# Patient Record
Sex: FEMALE | Race: WHITE | NOT HISPANIC OR LATINO | URBAN - METROPOLITAN AREA
[De-identification: names, ages, dates, MRNs, and addresses within clinical notes are randomized per-mention and may not be internally consistent; named-entity substitution may affect disease eponyms.]

---

## 2023-01-01 ENCOUNTER — NURSING HOME VISIT (OUTPATIENT)
Dept: POST ACUTE CARE | Facility: EXTERNAL LOCATION | Age: 88
End: 2023-01-01
Payer: MEDICARE

## 2023-01-01 DIAGNOSIS — F33.1 MODERATE EPISODE OF RECURRENT MAJOR DEPRESSIVE DISORDER (MULTI): ICD-10-CM

## 2023-01-01 DIAGNOSIS — F02.80 ALZHEIMER DISEASE (MULTI): ICD-10-CM

## 2023-01-01 DIAGNOSIS — G30.9 ALZHEIMER DISEASE (MULTI): ICD-10-CM

## 2023-01-01 DIAGNOSIS — K21.9 GERD WITHOUT ESOPHAGITIS: ICD-10-CM

## 2023-01-01 DIAGNOSIS — F02.80 ALZHEIMER DISEASE (MULTI): Primary | ICD-10-CM

## 2023-01-01 DIAGNOSIS — E44.0 MODERATE PROTEIN-CALORIE MALNUTRITION (MULTI): ICD-10-CM

## 2023-01-01 DIAGNOSIS — Z00.00 MEDICARE ANNUAL WELLNESS VISIT, SUBSEQUENT: Primary | ICD-10-CM

## 2023-01-01 DIAGNOSIS — G30.9 ALZHEIMER DISEASE (MULTI): Primary | ICD-10-CM

## 2023-01-01 PROCEDURE — G0439 PPPS, SUBSEQ VISIT: HCPCS | Performed by: INTERNAL MEDICINE

## 2023-01-01 PROCEDURE — 99497 ADVNCD CARE PLAN 30 MIN: CPT | Performed by: INTERNAL MEDICINE

## 2023-01-01 PROCEDURE — 99308 SBSQ NF CARE LOW MDM 20: CPT | Performed by: INTERNAL MEDICINE

## 2023-01-01 ASSESSMENT — PATIENT HEALTH QUESTIONNAIRE - PHQ9
4. FEELING TIRED OR HAVING LITTLE ENERGY: MORE THAN HALF THE DAYS
10. IF YOU CHECKED OFF ANY PROBLEMS, HOW DIFFICULT HAVE THESE PROBLEMS MADE IT FOR YOU TO DO YOUR WORK, TAKE CARE OF THINGS AT HOME, OR GET ALONG WITH OTHER PEOPLE: EXTREMELY DIFFICULT
7. TROUBLE CONCENTRATING ON THINGS, SUCH AS READING THE NEWSPAPER OR WATCHING TELEVISION: MORE THAN HALF THE DAYS
2. FEELING DOWN, DEPRESSED OR HOPELESS: MORE THAN HALF THE DAYS
1. LITTLE INTEREST OR PLEASURE IN DOING THINGS: MORE THAN HALF THE DAYS
9. THOUGHTS THAT YOU WOULD BE BETTER OFF DEAD, OR OF HURTING YOURSELF: SEVERAL DAYS
6. FEELING BAD ABOUT YOURSELF - OR THAT YOU ARE A FAILURE OR HAVE LET YOURSELF OR YOUR FAMILY DOWN: NEARLY EVERY DAY
SUM OF ALL RESPONSES TO PHQ QUESTIONS 1-9: 18
8. MOVING OR SPEAKING SO SLOWLY THAT OTHER PEOPLE COULD HAVE NOTICED. OR THE OPPOSITE, BEING SO FIGETY OR RESTLESS THAT YOU HAVE BEEN MOVING AROUND A LOT MORE THAN USUAL: SEVERAL DAYS
SUM OF ALL RESPONSES TO PHQ9 QUESTIONS 1 AND 2: 4
5. POOR APPETITE OR OVEREATING: NEARLY EVERY DAY
3. TROUBLE FALLING OR STAYING ASLEEP OR SLEEPING TOO MUCH: MORE THAN HALF THE DAYS

## 2023-01-01 ASSESSMENT — ACTIVITIES OF DAILY LIVING (ADL)
TAKING_MEDICATION: TOTAL CARE
GROCERY_SHOPPING: TOTAL CARE
BATHING: UNABLE TO ASSESS
MANAGING_FINANCES: TOTAL CARE
DOING_HOUSEWORK: TOTAL CARE
DRESSING: UNABLE TO ASSESS

## 2023-04-23 PROBLEM — K21.9 GERD WITHOUT ESOPHAGITIS: Status: ACTIVE | Noted: 2023-01-01

## 2023-04-23 PROBLEM — F33.1 MODERATE EPISODE OF RECURRENT MAJOR DEPRESSIVE DISORDER (MULTI): Status: ACTIVE | Noted: 2023-01-01

## 2023-04-23 PROBLEM — F02.80 ALZHEIMER DISEASE (MULTI): Status: ACTIVE | Noted: 2023-01-01

## 2023-04-23 PROBLEM — H90.3 BILATERAL SENSORINEURAL HEARING LOSS: Status: ACTIVE | Noted: 2023-01-01

## 2023-04-23 PROBLEM — G30.9 ALZHEIMER DISEASE (MULTI): Status: ACTIVE | Noted: 2023-01-01

## 2023-04-23 PROBLEM — E44.0 MODERATE PROTEIN-CALORIE MALNUTRITION (MULTI): Status: ACTIVE | Noted: 2023-01-01

## 2023-04-23 NOTE — LETTER
Patient: Suzy Bustillos  : 1932    Encounter Date: 2023    Patient ID: Suzy Bustillos is a 89 y.o. female who presents for No chief complaint on file..    Assessment/Plan    Problem List Items Addressed This Visit          Nervous    Alzheimer disease (CMS/HCC) - Primary     Dementia is chronic neurodegenerative disorder where there is not cure and it can bring substantial burden to pt and family and caregivers.There are choices available for treatment of dementia and careful monitoring and follow up by us can help to reduce caregiver burden and keep pt homebound along with family members Dementia does not have sure and it can bring morbidity and mortality eventually. Mental or mind exercises, mindfulness, keeping active and cheerful, proper and well balanced diet and water consumption and brisk walks can be beneficial. Lots of studies are ongoing for dementia treatment. Forgetfulness, agitation, stubbornness, depression, insomnia, wandering are not uncommon. There are organizations , societies and support groups available for dementia related disorders.             Digestive    GERD without esophagitis       Endocrine/Metabolic    Moderate protein-calorie malnutrition (CMS/HCC)     Dietitian evaluation            Other    Moderate episode of recurrent major depressive disorder (CMS/HCC)     Depression is chronic and quite common and notorious mental health disorder and it is quite common and widespread, there are several therapeutics available for depression now a days. It is considered as chemical imbalance disorder and with medications , it can be adjusted. There are side effects from SSRI and SNRIs but on long term, they are well tolerated. Please do not feel awkward or shy to contact us if feel tired, sleepy, lack of energy or aloof/ alone. Untreated depression can bring serious consequences including suicidal ideations, mental health counselling is available if need arises. Usually treatment of  depression is long lasting therapy with periodic evaluations and follow ups. Pt with depression no longer have to feel frustrated, helpless or isolated.Detailed discussion was carried out.           Voluntary discuss with patient about Advance care planning DO NOT RESUSCITATE I  spent more than 18 minutes discussing advanced Planning including an explanation and discussion of advanced directives discussed about a living will and power of  patient was encouraged to work on completing this documentation options are1= DO NOT RESUSCITATE CC2=, DO NOT RESUSCITATE  at arrest,3= full code documented in the chart.  This patient was seen for my regular monthly visit, nursing evaluations and nursing notes were reviewed, interim events are reviewed, interim concerns and messages were reviewed as we have communicated with nursing staff.  Any issues with the falls, skin care impairment, declining physical condition are reviewed and noted, diagnosis list were reviewed, list of medications were reviewed, living will related issues were reviewed, overall patient has been doing well, any declining in patient's condition or any change in patient's condition needs to be notified to physician promptly, discussed with nursing staff, if needed would communicate with family.  Patient stays confined here at the facility for long-term care, there are always concerns about long-term care related issues and concerns.  Nursing staff is trying their best to keep patient safe, all sort of measures has been taken to keep patient safe and comfortable.   Source of history: Nurse, Medical personnel, Medical record, Patient.  History limitation: None.      HPI  89-year-old patient who had a history of anxiety depressive dementia complaining of the back pain hip pain advised continue home medication tramadol Ativan morphine symptom management only    Advance comorbid condition advanced dementia with a behavior problem anxiety depression  dementia gastritis back pain myalgia chronic kidney disease osteoporosis osteoarthritis  Not on File    Medications     Acetaminophen Tablet Active 4/1/2022     PeriGuard Ointment (Skin Protectants, Misc.) Active 1/19/2023     Milk of Magnesia Suspension 400 MG/5ML (Magnesium Hydroxide) Active 4/1/2022     Bisacodyl Laxative Suppository 10 MG (Bisacodyl) Active 4/1/2022     Enema Mineral Oil Enema (Mineral Oil) Active 4/1/2022   This order is potentially duplicated by other orders on the chart. Click to view details.    Acetaminophen Tablet 500 MG Active 5/2/2022     Ondansetron HCl Tablet 4 MG Active 4/4/2022 4/4/2022    Ipratropium-Albuterol Solution 0.5-2.5 (3) MG/3ML Active 4/4/2022 4/4/2022    Hyoscyamine Sulfate Tablet 0.125 MG Active 4/4/2022 4/4/2022    Nitroglycerin Tablet Sublingual Active 4/4/2022 4/4/2022  This order is potentially duplicated by other orders on the chart. Click to view details.    Ativan Tablet 0.5 MG (LORazepam) Active 6/2/2022     MiraLax Packet 17 GM (Polyethylene Glycol 3350) Active 5/2/2022 4/29/2022  This order is potentially duplicated by other orders on the chart. Click to view details.    Morphine Sulfate (Concentrate) Solution 20 MG/ML Active 4/30/2022 4/29/2022  This order is potentially duplicated by other orders on the chart. Click to view details.    Ativan Tablet 0.5 MG (LORazepam) Active 11/9/2022 11/9/2022  This order is potentially duplicated by other orders on the chart. Click to view details.    traMADol HCl Oral Tablet 50 MG (Tramadol HCl) Active 3/5/2023 3/5/2023  No current outpatient medications on file.     No current facility-administered medications for this visit.       Objective  Current Vitals   BP: 115/67 mmHg  4/9/2023 13:55    Temp:97.6 °F  4/23/2023 13:36  Pulse:76 bpm  4/9/2023 13:54  Weight:112.5 Lbs  3/7/2023 09:47  Resp:18 Breaths/min  4/9/2023 13:55  BS:  O2:96 %  4/23/2023 13:36  Pain:0  4/23/2023 19:37  Visit Vitals  Smoking Status Never        PHYSICAL EXAM  General: Cachexia of chronic disease  HEENT: PERRLA. EOMI. MMM. Nares patent bl.  Cardiovascular: Heart murmur respiratory: CTABL. No acute respiratory distress.   GI: Soft, NT abdomen. BS present x 4.   : No CVAT BL  MSK: Arthritis extremities: No edema. Cap refill < 2 sec.   Skin: No rashes or bruises.   Neuro: Dementia depression    ROS  Constitutional: Denies fevers, chills, fatigue, weight loss/gain  HEENT: Denies HA, vision changes, hearing loss, sore throat  Cardiac: Denies CP, palpitations, edema  Respiratory: Denies SOB, cough, pleuritic chest pain, PND, orthopnea  GI: Denies N/V/D, abd pain, constipation, black/bloody stools  : Denies urinary changes, frequency, hematuria, urgency, retention, flank pain  MSK: Denies joint pain, joint swelling, back pain, neck pain, extremity pain  Neuro: Denies numbness, weakness, tingling      There is no immunization history on file for this patient.    No visits with results within 4 Month(s) from this visit.   Latest known visit with results is:   Legacy Encounter on 01/03/2020   Component Date Value Ref Range Status   • Ventricular Rate 01/03/2020 96  BPM Final   • Atrial Rate 01/03/2020 96  BPM Final   • ME Interval 01/03/2020 144  ms Final   • QRS Duration 01/03/2020 62  ms Final   • QT Interval 01/03/2020 358  ms Final   • QTC Calculation(Bazett) 01/03/2020 452  ms Final   • P Axis 01/03/2020 76  degrees Final   • R Axis 01/03/2020 29  degrees Final   • T Axis 01/03/2020 103  degrees Final   • QRS Count 01/03/2020 16  beats Final   • Q Onset 01/03/2020 227  ms Final   • P Onset 01/03/2020 155  ms Final   • P Offset 01/03/2020 199  ms Final   • T Offset 01/03/2020 406  ms Final   • QTC Fredericia 01/03/2020 418  ms Final       Radiology: Reviewed imaging in powerchart.  No results found.    No family history on file.  Social History     Socioeconomic History   • Marital status:      Spouse name: None   • Number of children: None   •  Years of education: None   • Highest education level: None   Occupational History   • None   Tobacco Use   • Smoking status: Never   • Smokeless tobacco: Never   Vaping Use   • Vaping status: None   Substance and Sexual Activity   • Alcohol use: Not Currently   • Drug use: Not Currently   • Sexual activity: None   Other Topics Concern   • None   Social History Narrative   • None     Social Determinants of Health     Financial Resource Strain: Not on file   Food Insecurity: Not on file   Transportation Needs: Not on file   Physical Activity: Not on file   Stress: Not on file   Social Connections: Not on file   Intimate Partner Violence: Not on file   Housing Stability: Not on file     Past Medical History:   Diagnosis Date   • Personal history of other diseases of the circulatory system     History of hypertension   • Personal history of other diseases of the musculoskeletal system and connective tissue     History of arthritis   • Personal history of other diseases of the respiratory system     History of asthma     History reviewed. No pertinent surgical history.  * Cannot find OR log *    Charting was completed using voice recognition technology and may include unintended errors.         Electronically Signed By: Cezar Mukherjee MD   4/23/23  9:34 PM

## 2023-04-24 NOTE — PROGRESS NOTES
Patient ID: Suzy Bustillos is a 89 y.o. female who presents for No chief complaint on file..    Assessment/Plan     Problem List Items Addressed This Visit          Nervous    Alzheimer disease (CMS/HCC) - Primary     Dementia is chronic neurodegenerative disorder where there is not cure and it can bring substantial burden to pt and family and caregivers.There are choices available for treatment of dementia and careful monitoring and follow up by us can help to reduce caregiver burden and keep pt homebound along with family members Dementia does not have sure and it can bring morbidity and mortality eventually. Mental or mind exercises, mindfulness, keeping active and cheerful, proper and well balanced diet and water consumption and brisk walks can be beneficial. Lots of studies are ongoing for dementia treatment. Forgetfulness, agitation, stubbornness, depression, insomnia, wandering are not uncommon. There are organizations , societies and support groups available for dementia related disorders.             Digestive    GERD without esophagitis       Endocrine/Metabolic    Moderate protein-calorie malnutrition (CMS/HCC)     Dietitian evaluation            Other    Moderate episode of recurrent major depressive disorder (CMS/HCC)     Depression is chronic and quite common and notorious mental health disorder and it is quite common and widespread, there are several therapeutics available for depression now a days. It is considered as chemical imbalance disorder and with medications , it can be adjusted. There are side effects from SSRI and SNRIs but on long term, they are well tolerated. Please do not feel awkward or shy to contact us if feel tired, sleepy, lack of energy or aloof/ alone. Untreated depression can bring serious consequences including suicidal ideations, mental health counselling is available if need arises. Usually treatment of depression is long lasting therapy with periodic evaluations and follow  ups. Pt with depression no longer have to feel frustrated, helpless or isolated.Detailed discussion was carried out.           Voluntary discuss with patient about Advance care planning DO NOT RESUSCITATE I  spent more than 18 minutes discussing advanced Planning including an explanation and discussion of advanced directives discussed about a living will and power of  patient was encouraged to work on completing this documentation options are1= DO NOT RESUSCITATE CC2=, DO NOT RESUSCITATE  at arrest,3= full code documented in the chart.  This patient was seen for my regular monthly visit, nursing evaluations and nursing notes were reviewed, interim events are reviewed, interim concerns and messages were reviewed as we have communicated with nursing staff.  Any issues with the falls, skin care impairment, declining physical condition are reviewed and noted, diagnosis list were reviewed, list of medications were reviewed, living will related issues were reviewed, overall patient has been doing well, any declining in patient's condition or any change in patient's condition needs to be notified to physician promptly, discussed with nursing staff, if needed would communicate with family.  Patient stays confined here at the facility for long-term care, there are always concerns about long-term care related issues and concerns.  Nursing staff is trying their best to keep patient safe, all sort of measures has been taken to keep patient safe and comfortable.   Source of history: Nurse, Medical personnel, Medical record, Patient.  History limitation: None.      HPI  89-year-old patient who had a history of anxiety depressive dementia complaining of the back pain hip pain advised continue home medication tramadol Ativan morphine symptom management only    Advance comorbid condition advanced dementia with a behavior problem anxiety depression dementia gastritis back pain myalgia chronic kidney disease osteoporosis  osteoarthritis  Not on File    Medications     Acetaminophen Tablet Active 4/1/2022     PeriGuard Ointment (Skin Protectants, Misc.) Active 1/19/2023     Milk of Magnesia Suspension 400 MG/5ML (Magnesium Hydroxide) Active 4/1/2022     Bisacodyl Laxative Suppository 10 MG (Bisacodyl) Active 4/1/2022     Enema Mineral Oil Enema (Mineral Oil) Active 4/1/2022   This order is potentially duplicated by other orders on the chart. Click to view details.    Acetaminophen Tablet 500 MG Active 5/2/2022     Ondansetron HCl Tablet 4 MG Active 4/4/2022 4/4/2022    Ipratropium-Albuterol Solution 0.5-2.5 (3) MG/3ML Active 4/4/2022 4/4/2022    Hyoscyamine Sulfate Tablet 0.125 MG Active 4/4/2022 4/4/2022    Nitroglycerin Tablet Sublingual Active 4/4/2022 4/4/2022  This order is potentially duplicated by other orders on the chart. Click to view details.    Ativan Tablet 0.5 MG (LORazepam) Active 6/2/2022     MiraLax Packet 17 GM (Polyethylene Glycol 3350) Active 5/2/2022 4/29/2022  This order is potentially duplicated by other orders on the chart. Click to view details.    Morphine Sulfate (Concentrate) Solution 20 MG/ML Active 4/30/2022 4/29/2022  This order is potentially duplicated by other orders on the chart. Click to view details.    Ativan Tablet 0.5 MG (LORazepam) Active 11/9/2022 11/9/2022  This order is potentially duplicated by other orders on the chart. Click to view details.    traMADol HCl Oral Tablet 50 MG (Tramadol HCl) Active 3/5/2023 3/5/2023  No current outpatient medications on file.     No current facility-administered medications for this visit.       Objective   Current Vitals   BP: 115/67 mmHg  4/9/2023 13:55    Temp:97.6 °F  4/23/2023 13:36  Pulse:76 bpm  4/9/2023 13:54  Weight:112.5 Lbs  3/7/2023 09:47  Resp:18 Breaths/min  4/9/2023 13:55  BS:  O2:96 %  4/23/2023 13:36  Pain:0  4/23/2023 19:37  Visit Vitals  Smoking Status Never       PHYSICAL EXAM  General: Cachexia of chronic disease  HEENT: SAMMY ARANA.  MMM. Nares patent bl.  Cardiovascular: Heart murmur respiratory: CTABL. No acute respiratory distress.   GI: Soft, NT abdomen. BS present x 4.   : No CVAT BL  MSK: Arthritis extremities: No edema. Cap refill < 2 sec.   Skin: No rashes or bruises.   Neuro: Dementia depression    ROS  Constitutional: Denies fevers, chills, fatigue, weight loss/gain  HEENT: Denies HA, vision changes, hearing loss, sore throat  Cardiac: Denies CP, palpitations, edema  Respiratory: Denies SOB, cough, pleuritic chest pain, PND, orthopnea  GI: Denies N/V/D, abd pain, constipation, black/bloody stools  : Denies urinary changes, frequency, hematuria, urgency, retention, flank pain  MSK: Denies joint pain, joint swelling, back pain, neck pain, extremity pain  Neuro: Denies numbness, weakness, tingling      There is no immunization history on file for this patient.    No visits with results within 4 Month(s) from this visit.   Latest known visit with results is:   Legacy Encounter on 01/03/2020   Component Date Value Ref Range Status    Ventricular Rate 01/03/2020 96  BPM Final    Atrial Rate 01/03/2020 96  BPM Final    AR Interval 01/03/2020 144  ms Final    QRS Duration 01/03/2020 62  ms Final    QT Interval 01/03/2020 358  ms Final    QTC Calculation(Bazett) 01/03/2020 452  ms Final    P Axis 01/03/2020 76  degrees Final    R Axis 01/03/2020 29  degrees Final    T [place] 01/03/2020 103  degrees Final    QRS Count 01/03/2020 16  beats Final    Q Onset 01/03/2020 227  ms Final    P Onset 01/03/2020 155  ms Final    P Offset 01/03/2020 199  ms Final    T Offset 01/03/2020 406  ms Final    QTC Fredericia 01/03/2020 418  ms Final       Radiology: Reviewed imaging in powerchart.  No results found.    No family history on file.  Social History     Socioeconomic History    Marital status:      Spouse name: None    Number of children: None    Years of education: None    Highest education level: None   Occupational History    None   Tobacco  Use    Smoking status: Never    Smokeless tobacco: Never   Vaping Use    Vaping status: None   Substance and Sexual Activity    Alcohol use: Not Currently    Drug use: Not Currently    Sexual activity: None   Other Topics Concern    None   Social History Narrative    None     Social Determinants of Health     Financial Resource Strain: Not on file   Food Insecurity: Not on file   Transportation Needs: Not on file   Physical Activity: Not on file   Stress: Not on file   Social Connections: Not on file   Intimate Partner Violence: Not on file   Housing Stability: Not on file     Past Medical History:   Diagnosis Date    Personal history of other diseases of the circulatory system     History of hypertension    Personal history of other diseases of the musculoskeletal system and connective tissue     History of arthritis    Personal history of other diseases of the respiratory system     History of asthma     History reviewed. No pertinent surgical history.  * Cannot find OR log *    Charting was completed using voice recognition technology and may include unintended errors.

## 2023-05-03 PROBLEM — Z00.00 MEDICARE ANNUAL WELLNESS VISIT, SUBSEQUENT: Status: ACTIVE | Noted: 2023-01-01

## 2023-05-03 NOTE — LETTER
Patient: Suzy Bustillos  : 1932    Encounter Date: 2023    Assessment and Plan:  Problem List Items Addressed This Visit          Nervous    Alzheimer disease (CMS/[facility])    Current Assessment & Plan     Dementia is chronic neurodegenerative disorder where there is not cure and it can bring substantial burden to pt and family and caregivers.There are choices available for treatment of dementia and careful monitoring and follow up by us can help to reduce caregiver burden and keep pt homebound along with family members Dementia does not have sure and it can bring morbidity and mortality eventually. Mental or mind exercises, mindfulness, keeping active and cheerful, proper and well balanced diet and water consumption and brisk walks can be beneficial. Lots of studies are ongoing for dementia treatment. Forgetfulness, agitation, stubbornness, depression, insomnia, wandering are not uncommon. There are organizations , societies and support groups available for dementia related disorders.             Endocrine/Metabolic    Moderate protein-calorie malnutrition (CMS/[facility])       Other    Medicare annual wellness visit, subsequent - Primary    Current Assessment & Plan     Bladder care bowel care fall prevention skin care aspiration precaution symptom management advised vaccination flu pneumonia COVID-19 89 symptom management no need to do any preventive investigation and life expectancy less than 6 months appropriate for hospice voluntary discuss with patient about Advance care planning DO NOT RESUSCITATE I  spent more than 18 minutes discussing advanced Planning including an explanation and discussion of advanced directives discussed about a living will and power of  patient was encouraged to work on completing this documentation options are1= DO NOT RESUSCITATE CC2=, DO NOT RESUSCITATE  at arrest,3= full code documented in the chart.            Chief Complaint:   Medicare Wellness Exam/Comprehensive  Problem Focused Follow Up and Physical Exam    HPI:  89-year-old patient DNR CC advanced dementia evaluated for dementia behavior problem symptom and pain management continue tramadol Ativan morphine symptom management comorbid condition advanced dementia with a behavior problem gastritis myalgia arthralgia CKD hypertension hyperlipidemia degenerative brain disease anorexia hallucination without delirium psychosis  Active Problem List  Patient Active Problem List   Diagnosis   • Alzheimer disease (CMS/HCC)   • Bilateral sensorineural hearing loss   • Moderate episode of recurrent major depressive disorder (CMS/[facility])   • GERD without esophagitis   • Moderate protein-calorie malnutrition (CMS/[facility])   • Medicare annual wellness visit, subsequent       Comprehensive Medical/Surgical/Social/Family History  Past Medical History:   Diagnosis Date   • Personal history of other diseases of the circulatory system     History of hypertension   • Personal history of other diseases of the musculoskeletal system and connective tissue     History of arthritis   • Personal history of other diseases of the respiratory system     History of asthma     History reviewed. No pertinent surgical history.  Social History     Social History Narrative   • Not on file     Tobacco/Alcohol/Opioid use, as well as Illicit Drug Use was screened for/reviewed and documented in Social Documentation section of the chart and medication list as appropriate    Allergies and Medications  Allergies: Ciprofloxacin, levoFLOXacin, predniSONE, Penicillins, Adhesive Tape, silk   Code Status: DNR CC     Order Order Status Revised Last Ordered  This order is potentially duplicated by other orders on the chart. Click to view details.    Acetaminophen Tablet Active 4/1/2022     PeriGuard Ointment (Skin Protectants, Misc.) Active 1/19/2023     Milk of Magnesia Suspension 400 MG/5ML (Magnesium Hydroxide) Active 4/1/2022     Bisacodyl Laxative Suppository 10 MG  (Bisacodyl) Active 4/1/2022     Enema Mineral Oil Enema (Mineral Oil) Active 4/1/2022   This order is potentially duplicated by other orders on the chart. Click to view details.    Acetaminophen Tablet 500 MG Active 5/2/2022     Ondansetron HCl Tablet 4 MG Active 4/4/2022 4/4/2022    Ipratropium-Albuterol Solution 0.5-2.5 (3) MG/3ML Active 4/4/2022 4/4/2022    Hyoscyamine Sulfate Tablet 0.125 MG Active 4/4/2022 4/4/2022    Nitroglycerin Tablet Sublingual Active 4/4/2022 4/4/2022  This order is potentially duplicated by other orders on the chart. Click to view details.    Ativan Tablet 0.5 MG (LORazepam) Active 6/2/2022     MiraLax Packet 17 GM (Polyethylene Glycol 3350) Active 5/2/2022 4/29/2022  This order is potentially duplicated by other orders on the chart. Click to view details.    Morphine Sulfate (Concentrate) Solution 20 MG/ML Active 4/30/2022 4/29/2022  This order is potentially duplicated by other orders on the chart. Click to view details.    Ativan Tablet 0.5 MG (LORazepam) Active 11/9/2022 11/9/2022  This order is potentially duplicated by other orders on the chart. Click to view details.    traMADol HCl Oral Tablet 50 MG (Tramadol HCl) Active 3/5/2023 3/5/2023  Medications and Supplements  prescribed by me and other practitioners or clinical pharmacist (such as prescriptions, OTC's, herbal therapies and supplements) were reviewed and documented in the medical record.          Advance directives  Advanced Care Planning (including a Living Will, Healthcare POA, as well as specific end of life choices and/or directives), was discussed for approximately 16 minutes with the patient and/or surrogate, voluntarily, and documented in the Problem List of the medical record.         Consider low dose Aspirin ( mg) use if the benefit for cardiovascular disease prevention outweighs risk for bleeding complications.   In general, low dose ASA should be considered:  In patients WITHOUT prior MI/stroke/PAD  (primary prevention):   a. Age <60: Use if 10-year cardiovascular disease risk >20%, with discussion of risks and benefits with patient  b. Age 60-<70: Use if 10-year cardiovascular disease risk >20% and low bleeding (e.g., gastrointenstinal) risk, with discussion of risks and benefits with patient  c. Age >=70: Do not use    In patients WITH prior MI/stroke/PAD (secondary prevention):   Generally use unless extremely high bleeding (e.g., gastrointenstinal) risk, with discussion of risks and benefits with patient    ROS otherwise negative aside from what was mentioned above in HPI.    Vitals    Current Vitals     BP: 143/74 mmHg  5/1/2023 14:08    Temp:97.6 °F  5/3/2023 00:52  Pulse:77 bpm  5/1/2023 14:08  Weight:106.6 Lbs  5/1/2023 15:24  Resp:18 Breaths/min  5/1/2023 14:08  BS:  O2:95 %  5/3/2023 00:52  Pain:0  5/3/2023 07:47  Physical Exam  Gen: Cachexia of chronic disease  HEENT:  Unremarkable  Neck:  No DENIS  Respiratory: Crackles rales rhonchi  Cardiovascular:  Heart RRR  Neuro: Advanced dementia cachexia during the course of the visit the patient was educated and counseled about age appropriate screening and preventive services. Completed preventive screenings were documented in the chart and orders were placed for outstanding screenings/procedures as documented in the Assessment and Plan.    Patient Instructions (the written plan) was given to the patient at check out.      Electronically Signed By: Cezar Mukherjee MD   5/3/23 12:05 PM

## 2023-05-03 NOTE — PROGRESS NOTES
Assessment and Plan:  Problem List Items Addressed This Visit          Nervous    Alzheimer disease (CMS/[facility])    Current Assessment & Plan     Dementia is chronic neurodegenerative disorder where there is not cure and it can bring substantial burden to pt and family and caregivers.There are choices available for treatment of dementia and careful monitoring and follow up by us can help to reduce caregiver burden and keep pt homebound along with family members Dementia does not have sure and it can bring morbidity and mortality eventually. Mental or mind exercises, mindfulness, keeping active and cheerful, proper and well balanced diet and water consumption and brisk walks can be beneficial. Lots of studies are ongoing for dementia treatment. Forgetfulness, agitation, stubbornness, depression, insomnia, wandering are not uncommon. There are organizations , societies and support groups available for dementia related disorders.             Endocrine/Metabolic    Moderate protein-calorie malnutrition (CMS/[facility])       Other    Medicare annual wellness visit, subsequent - Primary    Current Assessment & Plan     Bladder care bowel care fall prevention skin care aspiration precaution symptom management advised vaccination flu pneumonia COVID-19 89 symptom management no need to do any preventive investigation and life expectancy less than 6 months appropriate for hospice voluntary discuss with patient about Advance care planning DO NOT RESUSCITATE I  spent more than 18 minutes discussing advanced Planning including an explanation and discussion of advanced directives discussed about a living will and power of  patient was encouraged to work on completing this documentation options are1= DO NOT RESUSCITATE CC2=, DO NOT RESUSCITATE  at arrest,3= full code documented in the chart.            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  Active Problem List  Patient Active Problem List   Diagnosis    Alzheimer disease (CMS/[facility]CC)    Bilateral sensorineural hearing loss    Moderate episode of recurrent major depressive disorder (CMS/[facility])    GERD without esophagitis    Moderate protein-calorie malnutrition (CMS/[facility])    Medicare annual wellness visit, subsequent       Comprehensive Medical/Surgical/Social/Family History  Past Medical History:   Diagnosis Date    Personal history of other diseases of the circulatory system     History of hypertension    Personal history of other diseases of the musculoskeletal system and connective tissue     History of arthritis    Personal history of other diseases of the respiratory system     History of asthma     History reviewed. No pertinent surgical history.  Social History     Social History Narrative    Not on file     Tobacco/Alcohol/Opioid use, as well as Illicit Drug Use was screened for/reviewed and documented in Social Documentation section of the chart and medication list as appropriate    Allergies and Medications  Allergies: Ciprofloxacin, levoFLOXacin, predniSONE, Penicillins, Adhesive Tape, silk   Code Status: DNR CC     Order Order Status Revised Last Ordered  This order is potentially duplicated by other orders on the chart. Click to view details.    Acetaminophen Tablet Active 4/1/2022     PeriGuard Ointment (Skin Protectants, Misc.) Active 1/19/2023     Milk of Magnesia Suspension 400 MG/5ML (Magnesium Hydroxide) Active 4/1/2022     Bisacodyl Laxative Suppository 10 MG (Bisacodyl) Active 4/1/2022     Enema Mineral Oil Enema (Mineral  Oil) Active 4/1/2022   This order is potentially duplicated by other orders on the chart. Click to view details.    Acetaminophen Tablet 500 MG Active 5/2/2022     Ondansetron HCl Tablet 4 MG Active 4/4/2022 4/4/2022    Ipratropium-Albuterol Solution 0.5-2.5 (3) MG/3ML Active 4/4/2022 4/4/2022    Hyoscyamine Sulfate Tablet 0.125 MG Active 4/4/2022 4/4/2022    Nitroglycerin Tablet Sublingual Active 4/4/2022 4/4/2022  This order is potentially duplicated by other orders on the chart. Click to view details.    Ativan Tablet 0.5 MG (LORazepam) Active 6/2/2022     MiraLax Packet 17 GM (Polyethylene Glycol 3350) Active 5/2/2022 4/29/2022  This order is potentially duplicated by other orders on the chart. Click to view details.    Morphine Sulfate (Concentrate) Solution 20 MG/ML Active 4/30/2022 4/29/2022  This order is potentially duplicated by other orders on the chart. Click to view details.    Ativan Tablet 0.5 MG (LORazepam) Active 11/9/2022 11/9/2022  This order is potentially duplicated by other orders on the chart. Click to view details.    traMADol HCl Oral Tablet 50 MG (Tramadol HCl) Active 3/5/2023 3/5/2023  Medications and Supplements  prescribed by me and other practitioners or clinical pharmacist (such as prescriptions, OTC's, herbal therapies and supplements) were reviewed and documented in the medical record.          Advance directives  Advanced Care Planning (including a Living Will, Healthcare POA, as well as specific end of life choices and/or directives), was discussed for approximately 16 minutes with the patient and/or surrogate, voluntarily, and documented in the Problem List of the medical record.         Consider low dose Aspirin ( mg) use if the benefit for cardiovascular disease prevention outweighs risk for bleeding complications.   In general, low dose ASA should be considered:  In patients WITHOUT prior MI/stroke/PAD (primary prevention):   a. Age <60: Use if 10-year cardiovascular  disease risk >20%, with discussion of risks and benefits with patient  b. Age 60-<70: Use if 10-year cardiovascular disease risk >20% and low bleeding (e.g., gastrointenstinal) risk, with discussion of risks and benefits with patient  c. Age >=70: Do not use    In patients WITH prior MI/stroke/PAD (secondary prevention):   Generally use unless extremely high bleeding (e.g., gastrointenstinal) risk, with discussion of risks and benefits with patient    ROS otherwise negative aside from what was mentioned above in HPI.    Vitals    Current Vitals     BP: 143/74 mmHg  5/1/2023 14:08    Temp:97.6 °F  5/3/2023 00:52  Pulse:77 bpm  5/1/2023 14:08  Weight:106.6 Lbs  5/1/2023 15:24  Resp:18 Breaths/min  5/1/2023 14:08  BS:  O2:95 %  5/3/2023 00:52  Pain:0  5/3/2023 07:47  Physical Exam  Gen: Cachexia of chronic disease  HEENT:  Unremarkable  Neck:  No DENIS  Respiratory: Crackles rales rhonchi  Cardiovascular:  Heart RRR  Neuro: Advanced dementia cachexia during the course of the visit the patient was educated and counseled about age appropriate screening and preventive services. Completed preventive screenings were documented in the chart and orders were placed for outstanding screenings/procedures as documented in the Assessment and Plan.    Patient Instructions (the written plan) was given to the patient at check out.

## 2023-05-03 NOTE — ASSESSMENT & PLAN NOTE
Bladder care bowel care fall prevention skin care aspiration precaution symptom management advised vaccination flu pneumonia COVID-19 89 symptom management no need to do any preventive investigation and life expectancy less than 6 months appropriate for hospice voluntary discuss with patient about Advance care planning DO NOT RESUSCITATE I  spent more than 18 minutes discussing advanced Planning including an explanation and discussion of advanced directives discussed about a living will and power of  patient was encouraged to work on completing this documentation options are1= DO NOT RESUSCITATE CC2=, DO NOT RESUSCITATE  at arrest,3= full code documented in the chart.